# Patient Record
Sex: MALE | Race: WHITE
[De-identification: names, ages, dates, MRNs, and addresses within clinical notes are randomized per-mention and may not be internally consistent; named-entity substitution may affect disease eponyms.]

---

## 2019-07-11 ENCOUNTER — HOSPITAL ENCOUNTER (EMERGENCY)
Dept: HOSPITAL 96 - M.ERS | Age: 84
Discharge: ANOTHER HOSPITAL | End: 2019-07-11
Payer: OTHER GOVERNMENT

## 2019-07-11 VITALS — DIASTOLIC BLOOD PRESSURE: 58 MMHG | SYSTOLIC BLOOD PRESSURE: 90 MMHG

## 2019-07-11 VITALS — WEIGHT: 204.37 LBS | HEIGHT: 70 IN | BODY MASS INDEX: 29.26 KG/M2

## 2019-07-11 DIAGNOSIS — I95.9: ICD-10-CM

## 2019-07-11 DIAGNOSIS — I63.9: Primary | ICD-10-CM

## 2019-07-11 DIAGNOSIS — E10.9: ICD-10-CM

## 2019-07-11 LAB
ABSOLUTE BASOPHILS: 0.1 THOU/UL (ref 0–0.2)
ABSOLUTE EOSINOPHILS: 0.1 THOU/UL (ref 0–0.7)
ABSOLUTE MONOCYTES: 0.7 THOU/UL (ref 0–1.2)
ALBUMIN SERPL-MCNC: 2 G/DL (ref 3.4–5)
ALP SERPL-CCNC: 60 U/L (ref 46–116)
ALT SERPL-CCNC: 34 U/L (ref 30–65)
ANION GAP SERPL CALC-SCNC: 10 MMOL/L (ref 7–16)
APTT BLD: 25.5 SECONDS (ref 25–31.3)
AST SERPL-CCNC: 22 U/L (ref 15–37)
BASOPHILS NFR BLD AUTO: 0.9 %
BE: -1.6 MMOL/L
BILIRUB SERPL-MCNC: 0.2 MG/DL
BUN SERPL-MCNC: 11 MG/DL (ref 7–18)
CALCIUM SERPL-MCNC: 7.9 MG/DL (ref 8.5–10.1)
CHLORIDE SERPL-SCNC: 103 MMOL/L (ref 98–107)
CO2 SERPL-SCNC: 24 MMOL/L (ref 21–32)
CREAT SERPL-MCNC: 1.9 MG/DL (ref 0.6–1.3)
EOSINOPHIL NFR BLD: 0.7 %
GLUCOSE SERPL-MCNC: 90 MG/DL (ref 70–99)
GRANULOCYTES NFR BLD MANUAL: 51.6 %
HCT VFR BLD CALC: 26.2 % (ref 42–52)
HGB BLD-MCNC: 9.1 GM/DL (ref 14–18)
INR PPP: 1
LYMPHOCYTES # BLD: 4 THOU/UL (ref 0.8–5.3)
LYMPHOCYTES NFR BLD AUTO: 40.2 %
MAGNESIUM SERPL-MCNC: 2 MG/DL (ref 1.8–2.4)
MCH RBC QN AUTO: 29.8 PG (ref 26–34)
MCHC RBC AUTO-ENTMCNC: 34.6 G/DL (ref 28–37)
MCV RBC: 86.1 FL (ref 80–100)
MONOCYTES NFR BLD: 6.6 %
MPV: 8 FL. (ref 7.2–11.1)
NEUTROPHILS # BLD: 5.1 THOU/UL (ref 1.6–8.1)
NUCLEATED RBCS: 0 /100WBC
PCO2 BLD: 30.6 MMHG (ref 35–45)
PLATELET COUNT*: 411 THOU/UL (ref 150–400)
PO2 BLD: 154.5 MMHG (ref 75–100)
POTASSIUM SERPL-SCNC: 4.5 MMOL/L (ref 3.5–5.1)
PROT SERPL-MCNC: 5.9 G/DL (ref 6.4–8.2)
PROTHROMBIN TIME: 10.1 SECONDS (ref 9.2–11.5)
RBC # BLD AUTO: 3.04 MIL/UL (ref 4.5–6)
RDW-CV: 15.6 % (ref 10.5–14.5)
SODIUM SERPL-SCNC: 137 MMOL/L (ref 136–145)
TROPONIN-I LEVEL: <0.06 NG/ML (ref ?–0.06)
TROPONIN-I LEVEL: <0.06 NG/ML (ref ?–0.06)
WBC # BLD AUTO: 9.9 THOU/UL (ref 4–11)

## 2019-07-11 NOTE — CON
[facility] 
[address] [place]  [address]                    CONSULTATION                  
_______________________________________________________________________________
 
Name:       TORREY LLANES              Room:           [address] [place]  
.R.#:  [id number]      Account #:      [id number]  
Admission:  07/11/19     Attend Phys:    Raul Howard MD    
Discharge:               Date of Birth:  [birth year]  
         Report #: [id number]
                                                                     [id number]  
_______________________________________________________________________________
THIS REPORT FOR:  //name//                      
 
CC: AMAURI physician/PCP
    Raul Howard
 
DATE OF SERVICE:  07/11/2019
 
 
HISTORY OF PRESENT ILLNESS:  This is an 84-year-old male patient who was seen in
the Emergency Room.  The patient was admitted with acute onset of flaccid right
side and speech difficulty.  The family thinks he is back to the baseline.  His
blood pressure is running low.  The history is that this patient has completely
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  He
is being monitored.  He had a stroke within 2 months.  They told him it was not
a TIA and it was actually a stroke.  The symptoms were similar.  We are [facility]
 
REVIEW OF SYSTEMS:  Positive for the fact that he is hypotensive for some
reason.  He had a stroke within 3 months.  I do not know how big the stroke was.
 [facility] and I reviewed it.  The only record he has is that
of the medications.  A 14-point review of system was carried out and will be
described later and I will dictate an addendum.
 
PAST MEDICAL HISTORY:  Positive for stroke with about 2 months ago.
 
FAMILY HISTORY:  Negative.
 
SOCIAL HISTORY:  He is  and wife provided part of the history.
 
PHYSICAL EXAMINATION:  Indicate that he is alert.  He is responsive.  He can
talk.  Wife thinks his speech is back to his baseline.  I do not know what his
baseline is.  He knows what month it is.  His cranial nerve examinations appear
unremarkable.  Strength, sensation, and reflexes look symmetrical.  I could not
look at the fundus.  There is no meningeal sign in this patient.  Cardiac
examinations appear unremarkable.  His blood pressure is running low.
 
IMPRESSION AND PLAN:  This patient has ischemia in the left cerebral hemisphere.
 I discussed extensively with Dr. Crisostomo, the Emergency Room physician.  The
most likely scenario is that he has occluded carotid on the left side and
collateral supplies his left side.  His blood pressure dropped and his
collateral collapsed and he had symptom which is becoming better and he feels
back to his baseline.  He may have extended to the stroke.
 
Stroke within 3 months is contraindication for giving tPA and his stroke appears
to be secondary to hypoperfusion.  I still discussed with him his option of
getting tPA.  He does not want any tPA and his indications are not strong
 
 
 
[place] [id number]                    CONSULTATION                  
_______________________________________________________________________________
 
Name:       TORREY LLANES              Room:           [address] [place]  
.R.#:  [id number]      Account #:      [id number]  
Admission:  07/11/19     Attend Phys:    Raul Howard MD    
Discharge:               Date of Birth:  [birth year]  
         Report #: [id number]
                                                                     [id number]  
_______________________________________________________________________________
because he is back to his baseline and symptom appears to be hypoperfusion.
 
I asked the Emergency Room physician to get a stat MRI/MRA of the head and MRA
of the neck on him.  We will continue to make an effort to get some records from
[facility].  The patient has been counseled extensively for all his
options.  I have asked the nurses to keep his head flat.
 
Time spent about 50 minutes and about half of it counseling and coordinating his
care and I will look at the MRI when it is done.
 
 
 
 
 
 
 
 
 
 
 
 
 
 
 
 
 
 
 
 
 
 
 
 
 
 
 
 
 
 
 
 
 
 
 
                       
                                        By:                                
                 
D: 07/11/19 1329_______________________________________
T: 07/11/19 1354Pange Swanson MD            /nt

## 2019-07-11 NOTE — EKG
[place] [address]
Phone:  [phone]                     ELECTROCARDIOGRAM REPORT      
_______________________________________________________________________________
 
Name:       TORREY LLANES              Room:           [place] [address]    [place]  
.R.#:  [id number]      Account #:      [id number]  
Admission:  19     Attend Phys:    Raul Howard MD    
Discharge:               Date of Birth:  [birth year]  
         Report #: [id number]
    [id number]
_______________________________________________________________________________
THIS REPORT FOR:  //name//                      
 
                         [facility] ED
                                       
Test Date:    2019               Test Time:    11:40:01
Pat Name:     TORREY LLANES          Department:   
Patient ID:   [id number]            Room:         [facility]
Gender:       M                        Technician:   [facility]
:          1935               Requested By: Shani Crisostomo
Order Number: [id number] MD:   Ricardo Munoz
                                 Measurements
Intervals                              Axis          
Rate:         75                       P:            -9
NY:           165                      QRS:          -15
QRSD:         76                       T:            69
QT:           385                                    
QTc:          430                                    
                           Interpretive Statements
Sinus rhythm
Atrial premature complexes
Borderline left axis deviation
Abnormal R-wave progression, early transition
No previous ECG available for comparison
 
Electronically Signed On 2019 17:05:36 CDT by Ricardo Munoz
[email or web address]
 
 
 
 
 
 
 
 
 
 
 
 
 
 
 
 
 
  <ELECTRONICALLY SIGNED>
                                           By: Ricardo Munoz MD, [facility]   
  19     1705
D: 19 1140   _____________________________________
T: 19 1140   Ricardo Munoz MD, [facility]     /EPI